# Patient Record
Sex: MALE | Race: OTHER | Employment: FULL TIME | URBAN - METROPOLITAN AREA
[De-identification: names, ages, dates, MRNs, and addresses within clinical notes are randomized per-mention and may not be internally consistent; named-entity substitution may affect disease eponyms.]

---

## 2024-06-11 ENCOUNTER — OFFICE VISIT (OUTPATIENT)
Age: 39
End: 2024-06-11

## 2024-06-11 VITALS
RESPIRATION RATE: 16 BRPM | SYSTOLIC BLOOD PRESSURE: 122 MMHG | BODY MASS INDEX: 37.51 KG/M2 | OXYGEN SATURATION: 97 % | HEIGHT: 70 IN | WEIGHT: 262 LBS | HEART RATE: 77 BPM | DIASTOLIC BLOOD PRESSURE: 88 MMHG | TEMPERATURE: 98.5 F

## 2024-06-11 DIAGNOSIS — Z01.10 ENCOUNTER FOR HEARING EXAMINATION, UNSPECIFIED WHETHER ABNORMAL FINDINGS: ICD-10-CM

## 2024-06-11 DIAGNOSIS — Z02.1 PHYSICAL EXAM, PRE-EMPLOYMENT: Primary | ICD-10-CM

## 2024-06-11 ASSESSMENT — ENCOUNTER SYMPTOMS
SHORTNESS OF BREATH: 0
ABDOMINAL PAIN: 0
CHEST TIGHTNESS: 0
SORE THROAT: 0
COUGH: 0
DIARRHEA: 0
BACK PAIN: 0
RHINORRHEA: 0
VOMITING: 0
NAUSEA: 0

## 2024-06-11 ASSESSMENT — VISUAL ACUITY: OU: 1

## 2024-06-11 NOTE — PROGRESS NOTES
PROGRESS NOTE    SUBJECTIVE     Jimi Hoffman is a 38 y.o. male seen for:    Chief Complaint    Employment Physical         Patient has been hired to be a  at [employer] and is here for a pre-employment physical at the [facility] and [facility]. Patient is Turkmen-speaking and this visit was completed using a translating device and Turkmen-translated forms.    Patient does not have a primary care provider and has no significant medical or surgical history. Patient is feeling well and has no complaints today. Patient does not take any medications regularly. Patient is not sure when he last had a tetanus or TDAP vaccine.     Patient denies problems with hearing or vision. Patient has not used hearing protection or had any previous hearing testing before. Patient denies having noisy hobbies. Patient is aware he will be completing a drug screen and hearing exam today.         No current outpatient medications on file.     No current facility-administered medications for this visit.      No Known Allergies  History reviewed. No pertinent past medical history.  History reviewed. No pertinent surgical history.    Social History     Tobacco Use    Smoking status: Never    Smokeless tobacco: Never   Vaping Use    Vaping Use: Never used   Substance Use Topics    Alcohol use: Yes     Comment: Socially; 6-12 drinks per month; never more than 6 drinks in one evening    Drug use: Not Currently        History reviewed. No pertinent family history.    Review of Systems   Constitutional:  Negative for chills, diaphoresis, fatigue and fever.   HENT:  Negative for congestion, ear pain, hearing loss, rhinorrhea, sore throat and tinnitus.    Eyes:  Negative for visual disturbance.   Respiratory:  Negative for cough, chest tightness and shortness of breath.    Cardiovascular:  Negative for chest pain, palpitations and leg swelling.   Gastrointestinal:  Negative for abdominal pain, diarrhea, nausea and vomiting.

## 2024-08-30 ENCOUNTER — OFFICE VISIT (OUTPATIENT)
Age: 39
End: 2024-08-30

## 2024-08-30 VITALS
HEART RATE: 96 BPM | DIASTOLIC BLOOD PRESSURE: 75 MMHG | OXYGEN SATURATION: 96 % | RESPIRATION RATE: 16 BRPM | TEMPERATURE: 99.3 F | SYSTOLIC BLOOD PRESSURE: 130 MMHG

## 2024-08-30 DIAGNOSIS — J06.9 ACUTE UPPER RESPIRATORY INFECTION: Primary | ICD-10-CM

## 2024-08-30 LAB
INFLUENZA A ANTIGEN, POC: NEGATIVE
INFLUENZA B ANTIGEN, POC: NEGATIVE
LOT EXPIRE DATE: NORMAL
LOT KIT NUMBER: NORMAL
SARS-COV-2, POC: NORMAL
VALID INTERNAL CONTROL, POC: YES
VALID INTERNAL CONTROL: YES
VENDOR AND KIT NAME POC: NORMAL

## 2024-08-30 ASSESSMENT — ENCOUNTER SYMPTOMS
TROUBLE SWALLOWING: 0
VOICE CHANGE: 0
CHEST TIGHTNESS: 0
SINUS PRESSURE: 0
SHORTNESS OF BREATH: 0
BACK PAIN: 0
FACIAL SWELLING: 0
SINUS PAIN: 0
COUGH: 1
SORE THROAT: 1

## 2024-08-30 NOTE — PROGRESS NOTES
PROGRESS NOTE    SUBJECTIVE     Jimi Hoffman presents to the [facility]  with his spouse for:  Chief Complaint    URI         Cough  This is a new problem. The current episode started yesterday. Associated symptoms include chills, a fever, headaches, myalgias, postnasal drip, a sore throat and sweats. Pertinent negatives include no chest pain, ear pain, nasal congestion or shortness of breath. He has tried OTC cough suppressant for the symptoms. The treatment provided no relief.       Patient presents to the [facility]       No current outpatient medications on file.     No current facility-administered medications for this visit.      No Known Allergies  No past medical history on file.  No past surgical history on file.    Social History     Tobacco Use    Smoking status: Never    Smokeless tobacco: Never   Vaping Use    Vaping status: Never Used   Substance Use Topics    Alcohol use: Yes     Comment: Socially; 6-12 drinks per month; never more than 6 drinks in one evening    Drug use: Not Currently        No family history on file.    Review of Systems   Constitutional:  Positive for chills and fever. Negative for activity change, appetite change, diaphoresis, fatigue and unexpected weight change.   HENT:  Positive for postnasal drip and sore throat. Negative for dental problem, drooling, ear discharge, ear pain, facial swelling, hearing loss, mouth sores, nosebleeds, sinus pressure, sinus pain, sneezing, tinnitus, trouble swallowing and voice change.    Respiratory:  Positive for cough. Negative for chest tightness and shortness of breath.    Cardiovascular:  Negative for chest pain and palpitations.   Musculoskeletal:  Positive for myalgias. Negative for arthralgias, back pain, neck pain and neck stiffness.   Neurological:  Positive for headaches.   Psychiatric/Behavioral:  Negative for dysphoric mood.         OBJECTIVE    /75 (Site: Left Upper Arm,

## 2024-09-03 ENCOUNTER — TELEPHONE (OUTPATIENT)
Age: 39
End: 2024-09-03

## 2024-09-03 NOTE — TELEPHONE ENCOUNTER
Called patient to follow up on symptoms. Left message for patient to follow up with the [facility] at [phone] if he needs anything else.     ISAC Guerrier - NP